# Patient Record
Sex: MALE | Race: WHITE
[De-identification: names, ages, dates, MRNs, and addresses within clinical notes are randomized per-mention and may not be internally consistent; named-entity substitution may affect disease eponyms.]

---

## 2018-01-22 ENCOUNTER — HOSPITAL ENCOUNTER (INPATIENT)
Dept: HOSPITAL 74 - YASAS | Age: 58
LOS: 1 days | Discharge: LEFT BEFORE DISCHARGE | DRG: 894 | End: 2018-01-23
Attending: INTERNAL MEDICINE | Admitting: INTERNAL MEDICINE
Payer: COMMERCIAL

## 2018-01-22 VITALS — BODY MASS INDEX: 27.6 KG/M2

## 2018-01-22 DIAGNOSIS — I10: ICD-10-CM

## 2018-01-22 DIAGNOSIS — Z86.69: ICD-10-CM

## 2018-01-22 DIAGNOSIS — F10.230: Primary | ICD-10-CM

## 2018-01-22 DIAGNOSIS — F41.8: ICD-10-CM

## 2018-01-22 DIAGNOSIS — K21.9: ICD-10-CM

## 2018-01-22 LAB
APPEARANCE UR: CLEAR
BILIRUB UR STRIP.AUTO-MCNC: NEGATIVE MG/DL
COLOR UR: (no result)
HYALINE CASTS URNS QL MICRO: 4 /LPF
KETONES UR QL STRIP: (no result)
LEUKOCYTE ESTERASE UR QL STRIP.AUTO: NEGATIVE
MUCOUS THREADS URNS QL MICRO: (no result)
NITRITE UR QL STRIP: NEGATIVE
PH UR: 7 [PH] (ref 5–8)
PROT UR QL STRIP: (no result)
PROT UR QL STRIP: NEGATIVE
RBC # UR STRIP: NEGATIVE /UL
SP GR UR: 1.02 (ref 1–1.03)
UROBILINOGEN UR STRIP-MCNC: (no result) MG/DL (ref 0.2–1)

## 2018-01-22 PROCEDURE — HZ2ZZZZ DETOXIFICATION SERVICES FOR SUBSTANCE ABUSE TREATMENT: ICD-10-PCS | Performed by: INTERNAL MEDICINE

## 2018-01-22 RX ADMIN — METOPROLOL SUCCINATE SCH MG: 50 TABLET, EXTENDED RELEASE ORAL at 22:08

## 2018-01-22 RX ADMIN — RANITIDINE SCH MG: 150 TABLET ORAL at 22:08

## 2018-01-22 NOTE — HP
CIWA Score





- CIWA Score


Nausea/Vomitin-Mild Nausea/No Vomiting


Muscle Tremors: 5


Anxiety: 4-Mod. Anxious/Guarded


Agitation: 4-Moderately Restless


Paroxysmal Sweats: 2


Orientation: 1-Uncertain about Date


Tacttile Disturbances: 1-Very Mild Itch/Numbness


Auditory Disturbances: 0-None


Visual Disturbances: 0-None


Headache: 0-None Present


CIWA-Ar Total Score: 18





Admission ROS [facility]





- HPI


Chief Complaint: 





withdrawal sx


Allergies/Adverse Reactions: 


 Allergies











Allergy/AdvReac Type Severity Reaction Status Date / Time


 


No Known Allergies Allergy   Verified 18 16:49











History of Present Illness: 





57 years old male with long history of alcohol dependence has hypertension gerd 

and depression and anxiety is admitted to detox


Exam Limitations: No Limitations





- Ebola screening


Have you traveled outside of the country in the last 21 days: No


Have you had contact with anyone from an Ebola affected area: No


Have you been sick,other than usual withdrawal symptoms: No


Do you have a fever: No





- Review of Systems


Constitutional: Chills, Changes in sleep, Weight Stable


EENT: reports: No Symptoms Reported


Respiratory: reports: No Symptoms reported


Cardiac: reports: No Symptoms Reported


GI: reports: Nausea, Poor Fluid Intake, Indigestion, Abdominal cramping


: reports: No Symptoms Reported


Musculoskeletal: reports: No Symptoms Reported


Integumentary: reports: No Symptoms Reported


Neuro: reports: Seizure (2017 alcohol withdrawal related), Tremors


Endocrine: reports: No Symptoms Reported


Hematology: reports: No Symptoms Reported


Psychiatric: reports: Judgement Intact, Anxious, Depressed


Other Systems: Reviewed and Negative





Patient History





- Patient Medical History


Hx Anemia: No


Hx Asthma: No


Hx Chronic Obstructive Pulmonary Disease (COPD): No


Hx Cancer: No


Hx Cardiac Disorders: No


Hx Congestive Heart Failure: No


Hx Hypertension: Yes


Hx Hypercholesterolemia: No


Hx Pacemaker: No


HX Cerebrovascular Accident: No


Hx Seizures: Yes (etoh related seizures )


Hx Dementia: No


Hx Diabetes: No


Hx Gastrointestinal Disorders: No


Hx Liver Disease: No


Hx Genitourinary Disorders: No


Hx Sexually Transmitted Disorders: No


Hx Renal Disease (ESRD): No


Hx Thyroid Disease: No


Hx Human Immunodeficiency Virus (HIV): No


Hx Hepatitis C: No


Hx Depression: Yes


Hx Suicide Attempt: No


Hx Bipolar Disorder: No


Hx Schizophrenia: No





- Patient Surgical History


Past Surgical History: Yes


Hx Neurologic Surgery: Yes (Back sx in )


Hx Cataract Extraction: No


Hx Cardiac Surgery: No


Hx Lung Surgery: No


Hx Breast Surgery: No


Hx Breast Biopsy: No


Hx Abdominal Surgery: No


Hx Appendectomy: No


Hx Cholecystectomy: No


Hx Genitourinary Surgery: No


Hx Orthopedic Surgery: Yes ( righ elbow)


Other Surgical History: Sx for R  ulna nerve repair


Anesthesia Reaction: No





- PPD History


Previous Implant?: Yes


Documented Results: Negative w/o proof


Implanted On Prior R Admission?: No


PPD to be Administered?: Yes





- Smoking Cessation


Smoking history: Never smoked


Have you smoked in the past 12 months: No


Hx Chewing Tobacco Use: No


Initiated information on smoking cessation: No





- Substance & Tx. History


Hx Alcohol Use: Yes


Hx Substance Use: No


Substance Use Type: Alcohol


Hx Substance Use Treatment: Yes ( Fl cocaine addiction)





- Substances Abused


  ** Alcohol


Route: Oral


Frequency: Daily


Amount used: 1 LITER VODKA


Age of first use: 13


Date of Last Use: 17





Family Disease History





- Family Disease History


Family Disease History: [place]: Father (), Other: Father, Mother ()

, Brother (), Sister ()





Admission Physical Exam [facility]





- Vital Signs


Vital Signs: 


 Vital Signs - 24 hr











  18





  16:00


 


Temperature 96.8 F L


 


Pulse Rate 81


 


Respiratory 20





Rate 


 


Blood Pressure 141/104














- Physical


General Appearance: Yes: Appropriately Dressed, Moderate Distress, Alcohol on 

Breath, Tremorous, Irritable, Sweating, Anxious


HEENTM: Yes: Hearing grossly Normal, Normal ENT Inspection, Normocephalic, 

Normal Voice


Respiratory: Yes: Chest Non-Tender, Lungs Clear, Normal Breath Sounds, No 

Respiratory Distress, No Accessory Muscle Use


Neck: Yes: Supple, Trachea in good position


Breast: Yes: Breasts Symetrical


Cardiology: Yes: Regular Rhythm, Regular Rate, S1, S2


Abdominal: Yes: Non Tender, Soft, Increased Bowel Sounds


Genitourinary: Yes: Within Normal Limits


Back: Yes: Normal Inspection


Musculoskeletal: Yes: full range of Motion, Gait Steady, Back pain


Extremities: Yes: Normal Inspection, Normal Range of Motion, Non-Tender, Tremors


Neurological: Yes: Alert, Motor Strength 5/5, Normal Response, Depressed Affect


Integumentary: Yes: Warm


Lymphatic: Yes: Within Normal Limits





- Diagnostic


(1) Alcohol dependence with uncomplicated withdrawal


Current Visit: Yes   Status: Acute   





(2) Hypertension


Current Visit: Yes   Status: Chronic   


Qualifiers: 


   Hypertension type: essential hypertension   Qualified Code(s): I10 - 

Essential (primary) hypertension   





(3) GERD (gastroesophageal reflux disease)


Current Visit: Yes   Status: Chronic   


Qualifiers: 


   Esophagitis presence: without esophagitis   Qualified Code(s): K21.9 - Gastro

-esophageal reflux disease without esophagitis   





(4) Anxiety and depression


Current Visit: Yes   Status: Suspected   





Cleared for Admission S





- Detox or Rehab


[facility] Level of Care: Medically Managed


Detox Regimen/Protocol: Librium





[facility] Breath Alcohol Content


Breath Alcohol Content: 0.086





Urine Drug Screen





- Results


Drug Screen Negative: No


Urine Drug Screen Results: TCA-Tricyclic Antidepress

## 2018-01-23 VITALS — TEMPERATURE: 97.5 F | HEART RATE: 110 BPM | DIASTOLIC BLOOD PRESSURE: 96 MMHG | SYSTOLIC BLOOD PRESSURE: 140 MMHG

## 2018-01-23 LAB
ALBUMIN SERPL-MCNC: 3.7 G/DL (ref 3.4–5)
ALP SERPL-CCNC: 89 U/L (ref 45–117)
ALT SERPL-CCNC: 124 U/L (ref 12–78)
ANION GAP SERPL CALC-SCNC: 13 MMOL/L (ref 8–16)
AST SERPL-CCNC: 102 U/L (ref 15–37)
BILIRUB SERPL-MCNC: 1.4 MG/DL (ref 0.2–1)
BUN SERPL-MCNC: 11 MG/DL (ref 7–18)
CALCIUM SERPL-MCNC: 8.9 MG/DL (ref 8.5–10.1)
CHLORIDE SERPL-SCNC: 96 MMOL/L (ref 98–107)
CO2 SERPL-SCNC: 29 MMOL/L (ref 21–32)
CREAT SERPL-MCNC: 1.3 MG/DL (ref 0.7–1.3)
DEPRECATED RDW RBC AUTO: 14.7 % (ref 11.9–15.9)
GLUCOSE SERPL-MCNC: 166 MG/DL (ref 74–106)
HCT VFR BLD CALC: 47.3 % (ref 35.4–49)
HGB BLD-MCNC: 15.5 GM/DL (ref 11.7–16.9)
MCH RBC QN AUTO: 31.7 PG (ref 25.7–33.7)
MCHC RBC AUTO-ENTMCNC: 32.7 G/DL (ref 32–35.9)
MCV RBC: 97 FL (ref 80–96)
PLATELET # BLD AUTO: 91 K/MM3 (ref 134–434)
PMV BLD: 9.6 FL (ref 7.5–11.1)
POTASSIUM SERPLBLD-SCNC: 3.6 MMOL/L (ref 3.5–5.1)
PROT SERPL-MCNC: 7 G/DL (ref 6.4–8.2)
RBC # BLD AUTO: 4.88 M/MM3 (ref 4–5.6)
SODIUM SERPL-SCNC: 138 MMOL/L (ref 136–145)
WBC # BLD AUTO: 4 K/MM3 (ref 4–10)

## 2018-01-23 RX ADMIN — RANITIDINE SCH MG: 150 TABLET ORAL at 10:13

## 2018-01-23 RX ADMIN — METOPROLOL SUCCINATE SCH MG: 50 TABLET, EXTENDED RELEASE ORAL at 10:13

## 2018-01-23 NOTE — DS
[facility] Detox Discharge Summary


Admission Date: 


01/22/18





Discharge Date: 01/23/18





- History


Present History: Alcohol Dependence


Pertinent Past History: 


BELOW





- Physical Exam Results


Vital Signs: 


 Vital Signs











Temperature  97.5 F L  01/23/18 09:27


 


Pulse Rate  110 H  01/23/18 09:27


 


Respiratory Rate  18   01/23/18 09:27


 


Blood Pressure  140/96   01/23/18 09:27


 


O2 Sat by Pulse Oximetry (%)      











Pertinent Admission Physical Exam Findings: 


ADMITTED IN ACUTE WITHDRAWAL





ON DC STILL TREMULOUS/CONJ INJECTION





CIWA=10





WE DISCUSSED EXTENSIVELY THE RISKS OF AMA DEPARTURE BUT IN SPITE OF THOROUGH 

MOTIVATIONAL COUNSELING HE CHOSE TO LEAVE AMA DUE TO WORK RESPONSIBILITIES





 Laboratory Tests











  01/22/18 01/23/18





  22:40 07:00


 


WBC   4.0


 


RBC   4.88


 


Hgb   15.5


 


Hct   47.3


 


MCV   97.0 H


 


MCH   31.7


 


MCHC   32.7


 


RDW   14.7


 


Plt Count   91 L


 


MPV   9.6


 


Urine Color  Lali 


 


Urine Appearance  Clear 


 


Urine pH  7.0 


 


Ur Specific Gravity  1.019 


 


Urine Protein  2+ H 


 


Urine Glucose (UA)  Negative 


 


Urine Ketones  Trace H 


 


Urine Blood  Negative 


 


Urine Nitrite  Negative 


 


Urine Bilirubin  Negative 


 


Urine Urobilinogen  4.0 e.u/dl 


 


Ur Leukocyte Esterase  Negative 


 


Urine WBC (Auto)  3 


 


Urine RBC (Auto)  2 


 


Hyaline Casts  4 


 


Urine Mucus  Rare 








 Vital Signs - 24 hr











  01/22/18 01/22/18 01/23/18





  16:00 22:53 00:49


 


Temperature 96.8 F L 97.9 F 


 


Pulse Rate 81 109 H 


 


Respiratory 20 18 18





Rate   


 


Blood Pressure 141/104 156/93 














  01/23/18 01/23/18





  06:00 09:27


 


Temperature 98.1 F 97.5 F L


 


Pulse Rate 84 110 H


 


Respiratory 18 18





Rate  


 


Blood Pressure 153/82 140/96














- Treatment


Hospital Course: Detox Protocol Followed, Detoxed Safely, Responded well, 

Discharged Condition Good, Rehab Referral Accepted





- Medication


Discharge Medications: 


Ambulatory Orders





Amlodipine Besylate [Norvasc -] 5 mg PO DAILY 01/22/18 


Lisinopril 10 mg PO DAILY 01/22/18 


Metoprolol Succinate [Toprol Xl] 50 mg PO BID 01/22/18 


Omeprazole 40 mg PO DAILY 01/22/18 











- AMA


Did Patient Leave Against Medical Advice: Yes

## 2018-01-23 NOTE — CONSULT
[facility] Psychiatric Consult





- Data


Date of interview: 01/23/18


Admission source: [facility]


Identifying data: Pt. is a 57 year old male, engage, father of one, and 

currently employed. This is patient's first admission to [facility]. Pt. 

admitted to  detox for alcohol dependence.


Substance Abuse History: Substance & Tx. History.  Hx Alcohol Use: Yes.  Hx 

Substance Use: No.  Substance Use Type: Alcohol.  Hx Substance Use Treatment: 

Yes (1988 Fl cocaine addiction).  - Substances Abused.  ** Alcohol.  Route: 

Oral.  Frequency: Daily.  Amount used: 1 LITER VODKA.  Age of first use: 13.  

Date of Last Use: 01/22/18


Medical History: Hypertension,  Seizures (alcohol related 2/2017)


Psychiatric History: Pt. denies h/o psychiatric hospitalization, OPC, and 

suicide attempts.


Physical/Sexual Abuse/Trauma History: Denies.





Mental Status Exam





- Mental Status Exam


Alert and Oriented to: Time, Place, Person


Cognitive Function: Good


Patient Appearance: Well Groomed


Mood: Hopeful


Affect: Mood Congruent


Patient Behavior: Appropriate, Cooperative


Speech Pattern: Appropriate


Voice Loudness: Normal


Thought Process: Goal Oriented


Thought Disorder: Not Present


Hallucinations: Denies


Suicidal Ideation: Denies


Homicidal Ideation: Denies


Insight/Judgement: Poor


Sleep: Poorly


Appetite: Fair


Muscle strength/Tone: Normal


Gait/Station: Normal





Psychiatric Findings





- Problem List (Axis 1, 2,3)


(1) Alcohol dependence with uncomplicated withdrawal


Current Visit: Yes   Status: Acute   





(2) Anxiety disorder


Current Visit: Yes   Status: Suspected   Comment: Self reports   





(3) Alcohol dependence


Current Visit: Yes   Status: Acute   





- Initial Treatment Plan


Initial Treatment Plan: Psychoeducation provided. Detoxification in progress. 

Vistaril 50mg q6hr ordered for anxiety. Benefits and side effects discussed. 

Verbal consent given. Will continue to monitor.

## 2018-01-24 NOTE — EKG
Test Reason : 

Blood Pressure : ***/*** mmHG

Vent. Rate : 080 BPM     Atrial Rate : 080 BPM

   P-R Int : 174 ms          QRS Dur : 086 ms

    QT Int : 404 ms       P-R-T Axes : 035 031 046 degrees

   QTc Int : 465 ms

 

NORMAL SINUS RHYTHM

NORMAL ECG

NO PREVIOUS ECGS AVAILABLE

Confirmed by YUNI HERNANDEZ, ARABELLA ([id number]) on 1/24/2018 7:54:26 AM

 

Referred By:             Confirmed By:ARABELLA MORRISSEY MD